# Patient Record
Sex: FEMALE | Race: WHITE | NOT HISPANIC OR LATINO | URBAN - METROPOLITAN AREA
[De-identification: names, ages, dates, MRNs, and addresses within clinical notes are randomized per-mention and may not be internally consistent; named-entity substitution may affect disease eponyms.]

---

## 2022-12-26 ENCOUNTER — HOSPITAL ENCOUNTER (EMERGENCY)
Facility: HOSPITAL | Age: 47
Discharge: HOME | End: 2022-12-26
Attending: STUDENT IN AN ORGANIZED HEALTH CARE EDUCATION/TRAINING PROGRAM
Payer: COMMERCIAL

## 2022-12-26 VITALS
HEIGHT: 63 IN | WEIGHT: 216 LBS | OXYGEN SATURATION: 96 % | DIASTOLIC BLOOD PRESSURE: 67 MMHG | HEART RATE: 96 BPM | RESPIRATION RATE: 20 BRPM | BODY MASS INDEX: 38.27 KG/M2 | SYSTOLIC BLOOD PRESSURE: 129 MMHG | TEMPERATURE: 97 F

## 2022-12-26 DIAGNOSIS — L03.90 CELLULITIS, UNSPECIFIED CELLULITIS SITE: Primary | ICD-10-CM

## 2022-12-26 PROCEDURE — 99283 EMERGENCY DEPT VISIT LOW MDM: CPT

## 2022-12-26 RX ORDER — CLINDAMYCIN HYDROCHLORIDE 150 MG/1
450 CAPSULE ORAL EVERY 8 HOURS
Qty: 63 CAPSULE | Refills: 0 | Status: SHIPPED | OUTPATIENT
Start: 2022-12-26 | End: 2023-01-02

## 2022-12-26 NOTE — ED PROVIDER NOTES
"Encounter Date: 12/26/2022       History     Chief Complaint   Patient presents with    Vaginal Pain     Pt reports something bit vaginal area Thursday felt a bump thought it would be ingrown hair      Patient is a 47-year-old white female no significant past medical history presented to the ER today due to a boil her right labia.  Patient states it started as a "ingrown hair" eventually progressed after she kept squeezing it with para home tweezers.  Patient denies any notable rapid progression, fevers, chills, cough, congestion, chest pain, shortness of breath.  Patient denies any dysuria or vaginal discharge.  Patient denies any concerns for STIs.  Patient states slightly tender to palpation.  Patient states it has been draining as well.  Patient states onset was about 5 days prior to arrival.    Review of patient's allergies indicates:  No Known Allergies  No past medical history on file.  No past surgical history on file.  No family history on file.     Review of Systems   Constitutional:  Negative for chills, fatigue and fever.   HENT:  Negative for congestion, sore throat and trouble swallowing.    Eyes:  Negative for pain and visual disturbance.   Respiratory:  Negative for cough, shortness of breath and wheezing.    Cardiovascular:  Negative for chest pain and palpitations.   Gastrointestinal:  Negative for abdominal pain, blood in stool, constipation, diarrhea, nausea and vomiting.   Genitourinary:  Negative for dysuria and hematuria.   Musculoskeletal:  Negative for back pain and myalgias.   Skin:  Positive for rash and wound.   Neurological:  Negative for seizures, syncope and headaches.   Psychiatric/Behavioral:  Negative for confusion. The patient is not nervous/anxious.      Physical Exam     Initial Vitals [12/26/22 0908]   BP Pulse Resp Temp SpO2   129/67 96 20 97.4 °F (36.3 °C) 96 %      MAP       --         Physical Exam    Nursing note and vitals reviewed.  Constitutional: She appears " well-developed and well-nourished. She is not diaphoretic. No distress.   HENT:   Head: Normocephalic.   Right Ear: External ear normal.   Left Ear: External ear normal.   Nose: Nose normal.   Eyes: Conjunctivae and EOM are normal. Right eye exhibits no discharge. Left eye exhibits no discharge. No scleral icterus.   Neck:   Normal range of motion.  Cardiovascular:  Normal rate, regular rhythm and normal heart sounds.     Exam reveals no gallop and no friction rub.       No murmur heard.  Pulmonary/Chest: Breath sounds normal. No stridor. No respiratory distress. She has no wheezes. She has no rhonchi. She has no rales.   Abdominal: Abdomen is soft. She exhibits no distension. There is no abdominal tenderness. There is no rebound and no guarding.   Genitourinary:    Genitourinary Comments: Pelvic exam:  This exam was performed with RN in the room.  There is an area of erythema and induration measuring approximately 1.5 cm to the right labia majora.  No evidence of Bartholin cyst on exam.  No fluctuance or crepitus on exam.  No tender adenopathy.  Bedside ultrasound performed by me showed no drainable fluid accumulations or gas formation on exam.     Musculoskeletal:         General: Normal range of motion.      Cervical back: Normal range of motion.     Neurological: She is alert.   Skin: Skin is warm. No rash noted. No erythema.   Psychiatric: She has a normal mood and affect. Her behavior is normal.       ED Course   Procedures  Labs Reviewed - No data to display       Imaging Results    None          Medications - No data to display  Medical Decision Making:   Initial Assessment:   Overall well-appearing 47-year-old female  Differential Diagnosis:   Cellulitis, abscess, Bartholin gland abscess, Poncho's gangrene/necrotizing fasciitis  ED Management:  Vital signs stable patient is afebrile  Findings on physical exam was consistent with cellulitis  Did heavily considered for years gangrene hour given its slow  progression, no crepitus and no history of diabetes I do think is less likely   Bedside ultrasound showed no drainable fluid accumulations   Advised patient to not use tweezers in this area and will start her on antibiotics at this time   Clindamycin sent to pharmacy   Advised patient that if it worsens she is to return for full workup performed years gangrene and if it does not improve in 48 hours she is to return as well  I did offer her STI and urine studies which she states she will follow up with the primary care doctor for this   Return precautions discussed and follow up with PCP is recommended                        Clinical Impression:   Final diagnoses:  [L03.90] Cellulitis, unspecified cellulitis site (Primary)        ED Disposition Condition    Discharge Stable          ED Prescriptions       Medication Sig Dispense Start Date End Date Auth. Provider    clindamycin (CLEOCIN) 150 MG capsule Take 3 capsules (450 mg total) by mouth every 8 (eight) hours. for 7 days 63 capsule 12/26/2022 1/2/2023 Ciaran Gordon MD          Follow-up Information       Follow up With Specialties Details Why Contact Info    [facility] - Emergency Dept Emergency Medicine  If symptoms worsen [address] [facility] [address] [facility] [id number] [phone]             Ciaran Gordon MD  12/26/22 2943